# Patient Record
Sex: MALE | Race: ASIAN | NOT HISPANIC OR LATINO | Employment: FULL TIME | URBAN - METROPOLITAN AREA
[De-identification: names, ages, dates, MRNs, and addresses within clinical notes are randomized per-mention and may not be internally consistent; named-entity substitution may affect disease eponyms.]

---

## 2020-11-03 ENCOUNTER — OFFICE VISIT (OUTPATIENT)
Dept: FAMILY MEDICINE CLINIC | Facility: CLINIC | Age: 35
End: 2020-11-03

## 2020-11-03 VITALS
OXYGEN SATURATION: 99 % | WEIGHT: 193.4 LBS | HEART RATE: 121 BPM | DIASTOLIC BLOOD PRESSURE: 80 MMHG | SYSTOLIC BLOOD PRESSURE: 136 MMHG | TEMPERATURE: 97.5 F | RESPIRATION RATE: 12 BRPM | BODY MASS INDEX: 30.35 KG/M2 | HEIGHT: 67 IN

## 2020-11-03 DIAGNOSIS — R00.0 TACHYCARDIA: ICD-10-CM

## 2020-11-03 DIAGNOSIS — S91.332A NAIL WOUND OF LEFT FOOT, INITIAL ENCOUNTER: ICD-10-CM

## 2020-11-03 DIAGNOSIS — I45.9 INTRAVENTRICULAR CONDUCTION DELAY: ICD-10-CM

## 2020-11-03 DIAGNOSIS — G47.19 EXCESSIVE DAYTIME SLEEPINESS: Primary | ICD-10-CM

## 2020-11-03 PROCEDURE — 93000 ELECTROCARDIOGRAM COMPLETE: CPT | Performed by: FAMILY MEDICINE

## 2020-11-03 PROCEDURE — 99204 OFFICE O/P NEW MOD 45 MIN: CPT | Performed by: FAMILY MEDICINE

## 2020-11-03 RX ORDER — LISDEXAMFETAMINE DIMESYLATE 60 MG/1
CAPSULE ORAL
COMMUNITY
Start: 2020-08-27

## 2020-11-03 RX ORDER — FLUOXETINE HYDROCHLORIDE 40 MG/1
CAPSULE ORAL
COMMUNITY
Start: 2020-08-27

## 2020-11-03 RX ORDER — ATOMOXETINE 100 MG/1
CAPSULE ORAL
COMMUNITY
Start: 2020-08-22

## 2020-11-03 RX ORDER — DEXTROAMPHETAMINE SACCHARATE, AMPHETAMINE ASPARTATE, DEXTROAMPHETAMINE SULFATE AND AMPHETAMINE SULFATE 2.5; 2.5; 2.5; 2.5 MG/1; MG/1; MG/1; MG/1
TABLET ORAL
COMMUNITY
Start: 2020-08-27

## 2020-11-03 NOTE — PROGRESS NOTES
New Patient Office Visit      Patient Name: Rico Edwards  : 1985   MRN: [id number]     Chief Complaint:    Chief Complaint   Patient presents with   • Establish Care     physical, stepped on nail left foot        History of Present Illness: Rico Edwards is a 35 y.o. adult who is here today to establish care.    Patient is here today to establish care.    No wound to the foot-he wanted to get his left foot checked out because he stepped on a nail yesterday. He is also interested in getting a sleep study done. Patient said that he was working around the house and accidentally stepped on a nail. The nail went through his shoes and went through his left foot. He immediately removed the nail and washed his foot. Patient denies any changes in ROM, fever, chills, redness, swelling. Patient said he has been keeping the wound dry and putting a clean bandage over it.     Anxiety, ADHD, and depression-currently, the patient sees a mental health professional who prescribes him medication for his ADHD, anxiety, and depression. His provider told him that he should get a sleep study done. Patient's wife has been complaining of snoring. Patient said he wakes up about two times in the middle of the night. Patient naps from time to time during the day. Patient also talked a bit about his blood pressure and high heart rate. Two years ago, his 1 year old son was diagnosed with neuroblastoma which was a stressor for him and the family. He has started noticing his blood pressure and heart rate steadily going up since then. Patient checks his blood pressure every two months and it usually runs in the 130s/80s. Patient also said that his family usually has a high heart rate. Patient has been eating healthier lately. Patient does not exercise regularly. He works as a , so he does not walk all the time.     Tachycardia -patient reports a family history of tachycardia.  Patient thinks that maybe his heart rate is fast  today because he is in the doctor's office.    Subjective      Review of Systems:   Review of Systems   Constitutional: Negative for chills, fatigue, fever and unexpected weight loss.   HENT: Negative for ear discharge, ear pain, rhinorrhea and sore throat.    Eyes: Negative for visual disturbance.   Respiratory: Negative for cough, chest tightness and shortness of breath.    Cardiovascular: Negative for chest pain and leg swelling.   Gastrointestinal: Negative for abdominal pain, constipation and diarrhea.   Genitourinary: Negative for decreased libido and dysuria.   Musculoskeletal: Negative for arthralgias, back pain and myalgias.   Skin: Negative for rash.   Allergic/Immunologic: Positive for environmental allergies (seasonal). Negative for food allergies.   Neurological: Positive for headache (teeth grinding). Negative for syncope and light-headedness.   Psychiatric/Behavioral: Negative for depressed mood.       Past Medical History:   Past Medical History:   Diagnosis Date   • Anxiety 2   • Attention deficit hyperactivity disorder (ADHD) 2010   • Depression 2       Past Surgical History: History reviewed. No pertinent surgical history.    Family History:   Family History   Problem Relation Age of Onset   • No Known Problems Mother    • No Known Problems Father    • No Known Problems Sister    • No Known Problems Brother    • Cancer Son    • No Known Problems Maternal Grandmother    • Stroke Maternal Grandfather    • Stroke Paternal Grandmother    • Heart attack Paternal Grandfather        Social History:   Social History     Socioeconomic History   • Marital status:      Spouse name: Not on file   • Number of children: Not on file   • Years of education: Not on file   • Highest education level: Not on file   Tobacco Use   • Smoking status: Never Smoker   • Smokeless tobacco: Never Used   Substance and Sexual Activity   • Alcohol use: Never     Frequency: Never   • Drug use: Never   • Sexual activity:  "Yes       Medications:     Current Outpatient Medications:   •  amphetamine-dextroamphetamine (ADDERALL) 10 MG tablet, , Disp: , Rfl:   •  atomoxetine (STRATTERA) 100 MG capsule, , Disp: , Rfl:   •  FLUoxetine (PROzac) 40 MG capsule, , Disp: , Rfl:   •  Vyvanse 60 MG capsule, , Disp: , Rfl:     Allergies:   No Known Allergies    Objective     Physical Exam:  Vital Signs:   Vitals:    11/03/20 1538   BP: 136/80   BP Location: Left arm   Patient Position: Sitting   Cuff Size: Adult   Pulse: (!) 121   Resp: 12   Temp: 97.5 °F (36.4 °C)   TempSrc: Temporal   SpO2: 99%   Weight: 87.7 kg (193 lb 6.4 oz)   Height: 170.2 cm (67\")   PainSc:   3   PainLoc: Foot     Body mass index is 30.29 kg/m².     Physical Exam  Constitutional:       General: Rico Edwards is not in acute distress.     Appearance: Normal appearance. Rico Edwards is normal weight.   HENT:      Head: Normocephalic and atraumatic.      Right Ear: Tympanic membrane, ear canal and external ear normal. There is no impacted cerumen.      Left Ear: Tympanic membrane, ear canal and external ear normal. There is no impacted cerumen.      Mouth/Throat:      Mouth: Mucous membranes are moist.      Pharynx: Oropharynx is clear.   Eyes:      Pupils: Pupils are equal, round, and reactive to light.   Neck:      Musculoskeletal: Normal range of motion and neck supple.   Cardiovascular:      Rate and Rhythm: Normal rate and regular rhythm.      Pulses: Normal pulses.      Heart sounds: Normal heart sounds.   Pulmonary:      Effort: Pulmonary effort is normal. No respiratory distress.      Breath sounds: Normal breath sounds. No wheezing or rales.   Abdominal:      General: Abdomen is flat. Bowel sounds are normal. There is no distension.      Palpations: Abdomen is soft.      Tenderness: There is no abdominal tenderness. There is no guarding.   Musculoskeletal: Normal range of motion.      Right lower leg: No edema.      Left lower leg: No edema.      Left foot: Normal " range of motion.        Feet:    Skin:     General: Skin is warm.   Neurological:      General: No focal deficit present.      Mental Status: Rico Edwards is alert.   Psychiatric:         Mood and Affect: Mood normal.         Behavior: Behavior normal.         Assessment / Plan      Assessment/Plan:   Diagnoses and all orders for this visit:    1. Excessive daytime sleepiness (Primary)  -     Ambulatory Referral to Sleep Medicine    2. Tachycardia  -     Ambulatory Referral to Cardiology      ECG 12 Lead    Date/Time: 11/3/2020 5:07 PM  Performed by: Augustin Street DO  Authorized by: Augustin Street DO   Comparison: not compared with previous ECG   Previous ECG: no previous ECG available  Rhythm: sinus tachycardia  Rate: tachycardic  Conduction: non-specific intraventricular conduction delay  T Waves: T waves normal  QRS axis: normal  Other: no other findings    Clinical impression: abnormal EKG  Comments: Intraventricular conduction delay noted with widened QRS.            3. Nail wound of left foot, initial encounter  -     Cancel: Tdap Vaccine Greater Than or Equal To 6yo IM --- we are out of tetanus shot patient will get this at his pharmacy.  Patient verbalized understanding.  Patient will call back if wound worsens and will treat with Cipro and doxycycline or Clinda.  Would definitely have Cipro added onto which of her regimen to cover Pseudomonas.    4. Intraventricular conduction delay  -     Ambulatory Referral to Cardiology         1. Patient counseled on return yearly for annual visit.  Patient up-to-date with flu vaccine will get tetanus shot soon.      Follow Up:   Return in about 1 year (around 11/3/2021) for Annual.    Augustin Street DO  [facility] [place]       Please note that portions of this note may have been completed with a voice recognition program. Efforts were made to edit the dictations, but occasionally words are mistranscribed.

## 2021-03-17 ENCOUNTER — TELEMEDICINE (OUTPATIENT)
Dept: SLEEP MEDICINE | Facility: HOSPITAL | Age: 36
End: 2021-03-17

## 2021-03-17 DIAGNOSIS — R06.83 SNORING: Primary | ICD-10-CM

## 2021-03-17 DIAGNOSIS — E66.3 OVERWEIGHT: ICD-10-CM

## 2021-03-17 DIAGNOSIS — G47.33 OBSTRUCTIVE SLEEP APNEA, ADULT: ICD-10-CM

## 2021-03-17 PROCEDURE — 99202 OFFICE O/P NEW SF 15 MIN: CPT | Performed by: INTERNAL MEDICINE

## 2021-03-17 NOTE — PATIENT INSTRUCTIONS
Sleep Apnea  Sleep apnea is a condition in which breathing pauses or becomes shallow during sleep. Episodes of sleep apnea usually last 10 seconds or longer, and they may occur as many as 20 times an hour. Sleep apnea disrupts your sleep and keeps your body from getting the rest that it needs. This condition can increase your risk of certain health problems, including:  · Heart attack.  · Stroke.  · Obesity.  · Diabetes.  · Heart failure.  · Irregular heartbeat.  What are the causes?  There are three kinds of sleep apnea:  · Obstructive sleep apnea. This kind is caused by a blocked or collapsed airway.  · Central sleep apnea. This kind happens when the part of the brain that controls breathing does not send the correct signals to the muscles that control breathing.  · Mixed sleep apnea. This is a combination of obstructive and central sleep apnea.  The most common cause of this condition is a collapsed or blocked airway. An airway can collapse or become blocked if:  · Your throat muscles are abnormally relaxed.  · Your tongue and tonsils are larger than normal.  · You are overweight.  · Your airway is smaller than normal.  What increases the risk?  You are more likely to develop this condition if you:  · Are overweight.  · Smoke.  · Have a smaller than normal airway.  · Are elderly.  · Are male.  · Drink alcohol.  · Take sedatives or tranquilizers.  · Have a family history of sleep apnea.  What are the signs or symptoms?  Symptoms of this condition include:  · Trouble staying asleep.  · Daytime sleepiness and tiredness.  · Irritability.  · Loud snoring.  · Morning headaches.  · Trouble concentrating.  · Forgetfulness.  · Decreased interest in sex.  · Unexplained sleepiness.  · Mood swings.  · Personality changes.  · Feelings of depression.  · Waking up often during the night to urinate.  · Dry mouth.  · Sore throat.  How is this diagnosed?  This condition may be diagnosed with:  · A medical history.  · A physical  exam.  · A series of tests that are done while you are sleeping (sleep study). These tests are usually done in a sleep lab, but they may also be done at home.  How is this treated?  Treatment for this condition aims to restore normal breathing and to ease symptoms during sleep. It may involve managing health issues that can affect breathing, such as high blood pressure or obesity. Treatment may include:  · Sleeping on your side.  · Using a decongestant if you have nasal congestion.  · Avoiding the use of depressants, including alcohol, sedatives, and narcotics.  · Losing weight if you are overweight.  · Making changes to your diet.  · Quitting smoking.  · Using a device to open your airway while you sleep, such as:  ? An oral appliance. This is a custom-made mouthpiece that shifts your lower jaw forward.  ? A continuous positive airway pressure (CPAP) device. This device blows air through a mask when you breathe out (exhale).  ? A nasal expiratory positive airway pressure (EPAP) device. This device has valves that you put into each nostril.  ? A bi-level positive airway pressure (BPAP) device. This device blows air through a mask when you breathe in (inhale) and breathe out (exhale).  · Having surgery if other treatments do not work. During surgery, excess tissue is removed to create a wider airway.  It is important to get treatment for sleep apnea. Without treatment, this condition can lead to:  · High blood pressure.  · Coronary artery disease.  · In men, an inability to achieve or maintain an erection (impotence).  · Reduced thinking abilities.  Follow these instructions at home:  Lifestyle  · Make any lifestyle changes that your health care provider recommends.  · Eat a healthy, well-balanced diet.  · Take steps to lose weight if you are overweight.  · Avoid using depressants, including alcohol, sedatives, and narcotics.  · Do not use any products that contain nicotine or tobacco, such as cigarettes,  e-cigarettes, and chewing tobacco. If you need help quitting, ask your health care provider.  General instructions  · Take over-the-counter and prescription medicines only as told by your health care provider.  · If you were given a device to open your airway while you sleep, use it only as told by your health care provider.  · If you are having surgery, make sure to tell your health care provider you have sleep apnea. You may need to bring your device with you.  · Keep all follow-up visits as told by your health care provider. This is important.  Contact a health care provider if:  · The device that you received to open your airway during sleep is uncomfortable or does not seem to be working.  · Your symptoms do not improve.  · Your symptoms get worse.  Get help right away if:  · You develop:  ? Chest pain.  ? Shortness of breath.  ? Discomfort in your back, arms, or stomach.  · You have:  ? Trouble speaking.  ? Weakness on one side of your body.  ? Drooping in your face.  These symptoms may represent a serious problem that is an emergency. Do not wait to see if the symptoms will go away. Get medical help right away. Call your local emergency services (911 in the [place] Do not drive yourself to the hospital.  Summary  · Sleep apnea is a condition in which breathing pauses or becomes shallow during sleep.  · The most common cause is a collapsed or blocked airway.  · The goal of treatment is to restore normal breathing and to ease symptoms during sleep.  This information is not intended to replace advice given to you by your health care provider. Make sure you discuss any questions you have with your health care provider.  Document Revised: 06/03/2020 Document Reviewed: 08/13/2019  Elsevier Patient Education © 2021 [employer]

## 2021-03-18 NOTE — PROGRESS NOTES
Subjective   Rico Edwards is a 35 y.o. male is being seen for consultation today at the request of Augustin Street DO for the evaluation of snoring and nonrestorative sleep.  You have chosen to receive care through a telehealth visit.  Do you consent to use a video/audio connection for your medical care today? Yes    History of Present Illness  Patient complains that he is very sleepy and can fall asleep easily at any time during the day.  He opens frequently at night.  He has had snoring noted he says since he was a teenager.  Its been worse in the past year.  His weight has increased about 15 pounds in the past year.  He admits he has gained 40 pounds over the past 5 years.  He denies being told that he had apneas.  He denies awakening gasping for breath.  He admits that he is usually not rested.  He denies morning headaches.    He denies awakening with a dry mouth.  He denies ever breaking his nose or having a breathing through his nose.  He is sleepy during the day but denies falling asleep inappropriately.  He gets sleepy driving.  He has occasional kicking of his legs at night.  He denies any chronic pain.    He will go to bed at 11 PM.  He falls asleep in less than 30 minutes.  He awakens 3-4 times during the night.  He thinks he gets 5 to 6 hours sleep but does not feel rested.  He has hypertension noted occasionally recently but is not on therapy.  He denies any history of diabetes or coronary artery disease.  He has a history of ADHD and is on medications for that  No Known Allergies       Current Outpatient Medications:   •  amphetamine-dextroamphetamine (ADDERALL) 10 MG tablet, , Disp: , Rfl:   •  atomoxetine (STRATTERA) 100 MG capsule, , Disp: , Rfl:   •  FLUoxetine (PROzac) 40 MG capsule, , Disp: , Rfl:   •  Vyvanse 60 MG capsule, , Disp: , Rfl:     Social History    Tobacco Use      Smoking status: Never Smoker      Smokeless tobacco: Never Used       Social History     Substance and Sexual Activity    Alcohol Use Never       Caffeine: He says he averages less than 1 cup of coffee or 1 cup of tea per day    Past Medical History:   Diagnosis Date   • Anxiety 2   • Attention deficit hyperactivity disorder (ADHD) 2010   • Depression 2       History reviewed.  He had wisdom teeth extraction    Family History   Problem Relation Age of Onset   • No Known Problems Mother    • No Known Problems Father    • No Known Problems Sister    • No Known Problems Brother    • Cancer Son    • No Known Problems Maternal Grandmother    • Stroke Maternal Grandfather    • Stroke Paternal Grandmother    • Heart attack Paternal Grandfather        The following portions of the patient's history were reviewed and updated as appropriate: allergies, current medications, past family history, past medical history, past social history, past surgical history and problem list.    Review of Systems   Constitutional: Positive for activity change and fatigue.   HENT: Positive for sinus pressure.    Eyes: Negative.    Respiratory: Negative.    Cardiovascular: Negative.    Gastrointestinal: Negative.    Endocrine: Negative.    Genitourinary: Negative.    Musculoskeletal: Negative.    Skin: Negative.    Allergic/Immunologic: Negative.    Neurological: Positive for headaches.   Hematological: Negative.    Psychiatric/Behavioral: Positive for behavioral problems and decreased concentration.   [place] score is 12/24    Objective     There were no vitals taken for this visit.     Physical Exam  Patient appears to be awake and alert.  He does not appear to be in acute respiratory distress.  His stated weight is 190 pounds.  His height is 5 feet 8 inches.  His body mass index is 27.5.  Mallampati class II anatomy.    Assessment/Plan   Diagnoses and all orders for this visit:    1. Snoring (Primary)  -     Home Sleep Study; Future    2. Obstructive sleep apnea, adult  -     Home Sleep Study; Future    3. Overweight    Patient has a history of snoring and  nonrestorative sleep.  I think he gives a fairly good story for obstructive sleep apnea.  We will plan to proceed to home sleep testing.  We have discussed possible therapy including CPAP, weight control, oral appliance and surgery.  We have discussed long-term consequences of untreated obstructive sleep apnea including hypertension, diabetes, heart disease, stroke, and dementia.  We will reassess him after his study.  He is encouraged to maintain ideal body weight.  He is encouraged to avoid alcohol or sedatives close to bedtime.  He is encouraged to practice lateral position sleep.    Total time: 25 minutes exclusive of procedures.         Luke Jean MD [facility]  Sleep Medicine  Pulmonary and Critical Care Medicine

## 2021-05-24 ENCOUNTER — HOSPITAL ENCOUNTER (OUTPATIENT)
Dept: SLEEP MEDICINE | Facility: HOSPITAL | Age: 36
Discharge: HOME | End: 2021-05-24
Admitting: INTERNAL MEDICINE

## 2021-05-24 VITALS — BODY MASS INDEX: 29.82 KG/M2 | HEIGHT: 67 IN | WEIGHT: 190 LBS

## 2021-05-24 DIAGNOSIS — G47.33 OBSTRUCTIVE SLEEP APNEA, ADULT: ICD-10-CM

## 2021-05-24 DIAGNOSIS — R06.83 SNORING: ICD-10-CM

## 2021-05-24 PROCEDURE — 95800 SLP STDY UNATTENDED: CPT | Performed by: INTERNAL MEDICINE

## 2021-05-24 PROCEDURE — 95800 SLP STDY UNATTENDED: CPT

## 2021-05-26 DIAGNOSIS — R00.0 TACHYCARDIA: ICD-10-CM

## 2021-05-26 DIAGNOSIS — E66.3 OVERWEIGHT: ICD-10-CM

## 2021-05-26 DIAGNOSIS — G47.33 OBSTRUCTIVE SLEEP APNEA, ADULT: Primary | ICD-10-CM

## 2021-05-26 DIAGNOSIS — R06.83 SNORING: ICD-10-CM

## 2021-08-24 ENCOUNTER — TELEMEDICINE (OUTPATIENT)
Dept: SLEEP MEDICINE | Facility: HOSPITAL | Age: 36
End: 2021-08-24

## 2021-08-24 DIAGNOSIS — E66.3 OVERWEIGHT: ICD-10-CM

## 2021-08-24 DIAGNOSIS — G47.33 OBSTRUCTIVE SLEEP APNEA, ADULT: ICD-10-CM

## 2021-08-24 DIAGNOSIS — R06.83 SNORING: Primary | ICD-10-CM

## 2021-08-24 PROCEDURE — 99213 OFFICE O/P EST LOW 20 MIN: CPT | Performed by: INTERNAL MEDICINE

## 2021-08-25 NOTE — PROGRESS NOTES
"Carlos Edwards is a 35 y.o. male is here today for follow-up.  He is seen in follow-up of snoring nonrestorative sleep.  His primary care physician is Dr. Street.  You have chosen to receive care through a telehealth visit.  Do you consent to use a video/audio connection for your medical care today? Yes    History of Present Illness  Patient was last seen in clinic March 17.  He has a history of snoring and nonrestorative sleep.  He has a history of hypertension, ADHD, and is overweight.  He had a sleep study in May.  He returns for follow-up.  Has been on CPAP.    He admits he has been using his CPAP \"erratically\".  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  He tried to use it more frequently in the past few weeks.  He awakens occasionally in the early morning hours after he has been on it for 3 to 4 hours and then has difficulty getting back to sleep.  He denies any other changes.  Past Medical History:   Diagnosis Date   • Anxiety 2   • Attention deficit hyperactivity disorder (ADHD) 2010   • Depression 2       History reviewed. No pertinent surgical history.        Current Outpatient Medications:   •  amphetamine-dextroamphetamine (ADDERALL) 10 MG tablet, , Disp: , Rfl:   •  atomoxetine (STRATTERA) 100 MG capsule, , Disp: , Rfl:   •  FLUoxetine (PROzac) 40 MG capsule, , Disp: , Rfl:   •  Vyvanse 60 MG capsule, , Disp: , Rfl:     No Known Allergies    The following portions of the patient's history were reviewed and updated as appropriate: allergies, current medications and problem list.    Review of Systems  Constitutional: Positive for activity change and fatigue.   HENT: Positive for sinus pressure.    Eyes: Negative.    Respiratory: Negative.    Cardiovascular: Negative.    Gastrointestinal: Negative.    Endocrine: Negative.    Genitourinary: Negative.    Musculoskeletal: Negative.    Skin: Negative.    Allergic/Immunologic: " Negative.    Neurological: Positive for headaches.   Hematological: Negative.    Psychiatric/Behavioral: Positive for behavioral problems and decreased concentration.    [place] score is 6/24  Objective     There were no vitals taken for this visit.    Physical Exam  Patient appears to be awake and alert.  He is not appear to be in acute respiratory distress.  His stated weight is 188 pounds.  His height 5 feet 7 inches.  His body mass index 28.    His sleep study on May 24 showed an AHI of 22.7 overall.  Supine position his AHI was 31.3.    Download from his machine for the past 90 days shows he used it 45.6% of the time.  He is using it 3 hours 48 minutes per night.  His AHI is normal at 2.1.  His 90th percentile pressure is 10.5.    Assessment/Plan   Diagnoses and all orders for this visit:    1. Snoring (Primary)  -     Detailed AutoPAP Order    2. Obstructive sleep apnea, adult  -     Detailed AutoPAP Order    3. Overweight    Patient does have moderate obstructive sleep apnea.  He has excellent control of his respiratory events when he uses his CPAP.  He has not been using his CPAP machine with great regularity.  He is encouraged to increase his use of his CPAP machine and try to get at least 4 hours for 70% of the night.  He is encouraged to achieve ideal body weight.  He is encouraged to practice lateral position sleep.  He is encouraged to avoid alcohol and sedatives close to bedtime.  We will renew his supplies.  We will plan to see him back in 6 months.  We have discussed again long-term consequences of untreated obstructive sleep apnea including hypertension, diabetes, heart disease, stroke, and dementia.    Total time: 25 minutes exclusive of procedures.             Luke Jean MD [facility]  Sleep Medicine  Pulmonary and Critical Care Medicine      08/24/21  21:05 EDT